# Patient Record
Sex: MALE | Race: WHITE
[De-identification: names, ages, dates, MRNs, and addresses within clinical notes are randomized per-mention and may not be internally consistent; named-entity substitution may affect disease eponyms.]

---

## 2019-01-20 ENCOUNTER — HOSPITAL ENCOUNTER (EMERGENCY)
Dept: HOSPITAL 88 - ER | Age: 44
Discharge: HOME | End: 2019-01-20
Payer: SELF-PAY

## 2019-01-20 VITALS — SYSTOLIC BLOOD PRESSURE: 140 MMHG | DIASTOLIC BLOOD PRESSURE: 88 MMHG

## 2019-01-20 VITALS — BODY MASS INDEX: 33.18 KG/M2 | WEIGHT: 224 LBS | HEIGHT: 69 IN

## 2019-01-20 DIAGNOSIS — I10: ICD-10-CM

## 2019-01-20 DIAGNOSIS — R10.33: Primary | ICD-10-CM

## 2019-01-20 DIAGNOSIS — K43.9: ICD-10-CM

## 2019-01-20 DIAGNOSIS — R11.0: ICD-10-CM

## 2019-01-20 LAB
ALBUMIN SERPL-MCNC: 4 G/DL (ref 3.5–5)
ALBUMIN/GLOB SERPL: 1.2 {RATIO} (ref 0.8–2)
ALP SERPL-CCNC: 119 IU/L (ref 40–150)
ALT SERPL-CCNC: 30 IU/L (ref 0–55)
ANION GAP SERPL CALC-SCNC: 15.9 MMOL/L (ref 8–16)
BACTERIA URNS QL MICRO: (no result) /HPF
BASOPHILS # BLD AUTO: 0.1 10*3/UL (ref 0–0.1)
BASOPHILS NFR BLD AUTO: 0.7 % (ref 0–1)
BILIRUB UR QL: NEGATIVE
BUN SERPL-MCNC: 20 MG/DL (ref 7–26)
BUN/CREAT SERPL: 18 (ref 6–25)
CALCIUM SERPL-MCNC: 8.8 MG/DL (ref 8.4–10.2)
CHLORIDE SERPL-SCNC: 103 MMOL/L (ref 98–107)
CLARITY UR: CLEAR
CO2 SERPL-SCNC: 20 MMOL/L (ref 22–29)
COLOR UR: YELLOW
DEPRECATED NEUTROPHILS # BLD AUTO: 5 10*3/UL (ref 2.1–6.9)
DEPRECATED RBC URNS MANUAL-ACNC: (no result) /HPF (ref 0–5)
EGFRCR SERPLBLD CKD-EPI 2021: > 60 ML/MIN (ref 60–?)
EOSINOPHIL # BLD AUTO: 0.1 10*3/UL (ref 0–0.4)
EOSINOPHIL NFR BLD AUTO: 1.2 % (ref 0–6)
EPI CELLS URNS QL MICRO: (no result) /LPF
ERYTHROCYTE [DISTWIDTH] IN CORD BLOOD: 12.9 % (ref 11.7–14.4)
GLOBULIN PLAS-MCNC: 3.4 G/DL (ref 2.3–3.5)
GLUCOSE SERPLBLD-MCNC: 150 MG/DL (ref 74–118)
HCT VFR BLD AUTO: 46.4 % (ref 38.2–49.6)
HGB BLD-MCNC: 16.3 G/DL (ref 14–18)
KETONES UR QL STRIP.AUTO: NEGATIVE
LEUKOCYTE ESTERASE UR QL STRIP.AUTO: NEGATIVE
LIPASE SERPL-CCNC: 20 U/L (ref 8–78)
LYMPHOCYTES # BLD: 2 10*3/UL (ref 1–3.2)
LYMPHOCYTES NFR BLD AUTO: 26.6 % (ref 18–39.1)
MCH RBC QN AUTO: 31 PG (ref 28–32)
MCHC RBC AUTO-ENTMCNC: 35.1 G/DL (ref 31–35)
MCV RBC AUTO: 88.2 FL (ref 81–99)
MONOCYTES # BLD AUTO: 0.5 10*3/UL (ref 0.2–0.8)
MONOCYTES NFR BLD AUTO: 6.3 % (ref 4.4–11.3)
NEUTS SEG NFR BLD AUTO: 64.9 % (ref 38.7–80)
NITRITE UR QL STRIP.AUTO: NEGATIVE
PLATELET # BLD AUTO: 257 X10E3/UL (ref 140–360)
POTASSIUM SERPL-SCNC: 3.9 MMOL/L (ref 3.5–5.1)
PROT UR QL STRIP.AUTO: NEGATIVE
RBC # BLD AUTO: 5.26 X10E6/UL (ref 4.3–5.7)
SODIUM SERPL-SCNC: 135 MMOL/L (ref 136–145)
SP GR UR STRIP: 1.01 (ref 1.01–1.02)
UROBILINOGEN UR STRIP-MCNC: 0.2 MG/DL (ref 0.2–1)
WBC #/AREA URNS HPF: (no result) /HPF (ref 0–5)

## 2019-01-20 PROCEDURE — 99283 EMERGENCY DEPT VISIT LOW MDM: CPT

## 2019-01-20 PROCEDURE — 83690 ASSAY OF LIPASE: CPT

## 2019-01-20 PROCEDURE — 80053 COMPREHEN METABOLIC PANEL: CPT

## 2019-01-20 PROCEDURE — 74177 CT ABD & PELVIS W/CONTRAST: CPT

## 2019-01-20 PROCEDURE — 87086 URINE CULTURE/COLONY COUNT: CPT

## 2019-01-20 PROCEDURE — 85025 COMPLETE CBC W/AUTO DIFF WBC: CPT

## 2019-01-20 PROCEDURE — 81001 URINALYSIS AUTO W/SCOPE: CPT

## 2019-01-20 PROCEDURE — 36415 COLL VENOUS BLD VENIPUNCTURE: CPT

## 2019-01-20 NOTE — XMS REPORT
Clinical Summary

                             Created on: 2019



Víctor Lawler Kevin

External Reference #: [id number]

: 1975

Sex: Male



Demographics







                          Address                   [address] [id number]

 

                          Home Phone                [phone]

 

                          Preferred Language        English

 

                          Marital Status            Single

 

                          [employer]ybeterian Affiliation     Unknown

 

                          Race                      White

 

                          Ethnic Group              Non-





Author







                          Author                    [facility]

 

                          Organization              [facility]

 

                          Address                   Unknown

 

                          Phone                     Unavailable







Support







                Name            Relationship    Address         Phone

 

                    Keyanna Bajwa    [employer]                [address] [place]  [address]                    [phone]







Care Team Providers







                    Care Team Member Name    Role                Phone

 

                    Andrew Andrews MD    PCP                 [phone]







Allergies

No Known Allergies



Medications







                          End Date                  Status



              Medication     Sig          Dispensed     Refills      Start  



                                         Date  

 

                                                    Active



                     amLODIPine (NORVASC) 10     Take 10 mg by       0   



                           mg tablet                 mouth daily.     







Active Problems







 



                           Problem                   Noted Date

 

 



                           Flank pain                2017







Social History







                                        Date



                 Tobacco Use     Types           Packs/Day       Years Used 

 

                                         



                                         Never Smoker    

 

    



                                         Smokeless Tobacco: Never   



                                         Used   









                                        Tobacco Cessation: Counseling Given: No











   



                 Alcohol Use     Drinks/Week     oz/Week         Comments

 

   



                                         Yes   









 



                           Sex Assigned at Birth     Date Recorded

 

 



                                         Not on file 









                                        Industry



                           Job Start Date            Occupation 

 

                                        Not on file



                           Not on file               Not on file 









                                        Travel End



                           Travel History            Travel Start 

 





                                         No recent travel history available.







Last Filed Vital Signs

Not on file



Plan of Treatment







   



                 Health Maintenance     Due Date        Last Done       Comments

 

   



                           INFLUENZA VACCINE         2018  







Results

Not on fileafter 2018



Insurance







     



            Payer      Benefit     Subscriber ID     Type       Phone      Address



                                         Plan /    



                                         Group    

 

     



                 BCBS            BCBS            xxxxxxxxxxxxxxx     PPO  



                                         CHOICE    



                                         PPO/[employer] PPO    









     



            Guarantor Name     Account     Relation to     Date of     Phone      Billing Address



                     Type                Patient             Birth  

 

     



            Víctor Lawler     Personal/F     Self       1975     [phone]     [address] [place] [address]               (Home)              [place] [address]

## 2019-01-20 NOTE — DIAGNOSTIC IMAGING REPORT
EXAM:  CT of the abdomen and pelvis WITH contrast



HISTORY:  Pain, r/o hernia sbo divertculitis appy    

COMPARISON:  CT of the abdomen and pelvis November 3, 2017..



TECHNIQUE: 

The abdomen and pelvis were scanned utilizing a multidetector helical scanner. 

Coronal and sagittal reformats are provided.

            PROTOCOL:  Routine

            IV CONTRAST:  100 cc of Isovue-370.

            ORAL CONTRAST:  Dilute Gastrografin

            RADIATION DOSE: Total DLP: 622.02 mGy*cm

                      Estimated effective dose:  (DLP x 0.015 x size factor)

Dose modulation, iterative reconstruction, and/or weight based adjustment of

the mA/kV was utilized to reduce the radiation dose to as low as reasonably

achievable.

            COMPLICATIONS: None



FINDINGS:

LINES and TUBES: None.



LOWER THORAX:  Unremarkable



HEPATOBILIARY: No focal hepatic lesions. No biliary ductal dilation. 

GALLBLADDER: No radio-opaque stones or sludge.  No wall thickening.

SPLEEN: No splenomegaly. 

PANCREAS: No focal masses or ductal dilatation.  

ADRENALS: No adrenal nodules    



KIDNEYS/URETERS:  Resolved hydronephrosis. No cystic or solid mass lesions.  A

2 mm nonobstructing stone at the interpolar region of the right kidney. The

large stone near the inferior pole is no longer present and there is been

interval removal of the right ureteral stent.



GI TRACT: No abnormal distention, wall thickening, or evidence of bowel

obstruction.  There are diverticula within the colon without evidence of

diverticulitis.  The appendix remains normal.  Radiopaque contrast within the

distal esophagus.



PELVIC ORGANS/BLADDER: The urinary bladder is partially decompressed.



LYMPH NODES: No lymphadenopathy.

VESSELS: Unremarkable.

PERITONEUM / RETROPERITONEUM: No free air or fluid.

BONES: Unremarkable.

SOFT TISSUES: A small fat-containing ventral midline upper abdominal hernia

(series 2 image 41 and sagittal image 75), increased focal fat stranding in

comparison to November 2017. Stable appearing small fat-containing umbilical

hernia.



IMPRESSION: 

1.  No acute intra-abdominal nor intrapelvic abnormality.

2.  No acute appendicitis or acute diverticulitis.

3.  Small nonobstructing 2 mm right renal stone.

4.  A small ventral midline upper abdominal fat-containing hernia with adjacent

inflammatory changes which may reflect mechanical irritation or a component of

vascular compromise.  Correlate with physical exam if the pain localizes to

this region.

5.  Stable fat-containing small umbilical hernia.



Signed by: JONATHAN Duncan.CASEY., M.M.M. on 1/20/2019 5:32 PM

## 2019-01-20 NOTE — XMS REPORT
Patient Summary Document

                             Created on: 2019



LEEANNA BAUER

External Reference #: [id number]

: 1975

Sex: Male



Demographics







                          Address                   [address] [place]  [address]

 

                          Home Phone                [phone]

 

                          Preferred Language        Unknown

 

                          Marital Status            Unknown

 

                          Mandaeism Affiliation     Unknown

 

                          Race                      Unknown

 

                                        Additional Race(s)  

 

                          Ethnic Group              Unknown





Author







                          Author                    [facility]

 

                          [facility]

 

                          Address                   Unknown

 

                          Phone                     Unavailable







Support







                Name            Relationship    Address         Phone

 

                    NONE,  GIVEN        PRS                 [address] [place], [place]  [address] [phone]

 

                    NONE,  GIVEN        PRS                 [address] [place]  [address]                       [phone]

 

                    NONE,  OTHER        PRS                 [address] [place], [place]  [address] [phone]

 

                    TYLER MARTINEZ    PRS                 [address] [place], [place]  [address] [phone]







Care Team Providers







                    Care Team Member Name    Role                Phone

 

                    JONATHAN BRISENO       Unavailable         Unavailable

 

                    LUISA LAO     Unavailable         Unavailable







Payers







             Payer Name    Policy Type    Policy Number    Effective Date    Expiration Date







Problems

This patient has no known problems.



Allergies, Adverse Reactions, Alerts







          Allergy Name    Allergy Type    Status    Severity    Reaction(s)    Onset Date    Inactive 

Date                      Treating Clinician        Comments

 

        No Known Allergies    DA      Active    U               2013 00:00:00                     







Medications

This patient has no known medications.



Results







           Test Description    Test Time    Test Comments    Text Results    Atomic Results    Result

 Comments

 

                CT ABDOMEN/PELVIS [place] [address]      Patient Name: LEEANNA BAUER 
 MR #: [id number]    : 1975 Age/Sex: 42/M  Acct #: [id number] Req #: 
[id number]  Adm Physician:     Ordered by: AUDELIA BRISENO MD  Report #: [id number]
  Location: ER  Room/Bed:     
_______________________________________________________________________________
____________________    Procedure: [id number] CT/CT ABDOMEN/PELVIS WO  Exam Date:
17                            Exam Time: 1701       REPORT STATUS: Signed 
  PROCEDURE: CT ABDOMEN AND PELVIS WITHOUT CONTRAST   COMPARISON:   CT 
abdomen/pelvis 17.   INDICATIONS:   RIGHT SIDE FLANK PAIN FOR 3 DAYS   
TECHNIQUE: Axial CT images through the abdomen and pelvis were obtained    
without IV or oral contrast. Coronal and sagittal reformations were    created. 
     FINDINGS:   Lung bases: Noncalcified nodule in the posterior left lower 
lobe    measures 5 mm and is stable. Posterior right lower lobe soft tissue    
nodule measures 4 mm and is stable. An anterior pericardial effusion    measures
8 mm (previously, 5 mm).       Liver: Normal attenuation without mass.        
Spleen: Normal size and attenuation without mass.       Biliary: The gallbladder
is present and normal in morphology. No    biliary ductal dilatation. No 
evidence of gallstone.       Pancreas: Normal attenuation without mass. No d
uctal dilatation.       Adrenal Glands: No evidence of mass.       Kidneys:     
Right kidney: Stent in the right renal collecting system extends into    the 
bladder. Hydronephrosis has improved. A stone in the lower pole    measures 10 
mm. Previously, it was in the renal pelvis at the UPJ. No    calculus along the 
course of the stent. No cortical mass.       Left kidney: No renal calculus, 
cortical mass, or perinephric    inflammation. No collecting system dilatation. 
     Vasculature: The aorta and IVC are normal in diameter.       GI: The 
stomach, small bowel, and large bowel are normal in diameter    with normal wall
thickness. The appendix is normal.       Peritoneum/Retroperitoneum: No free 
fluid or fluid collection.       Bladder: No mural thickening or intraluminal 
calculi.        Reproductive organs: Normal.       Musculoskeletal: No focal 
osseous lesions.                 CONCLUSION:           1.   10 mm calculus is 
now in the lower pole of the right kidney. Right    ureteral stent is in 
appropriate position with resolution of    hydronephrosis.   2. No evidence of 
calculus in the left kidney, ureter, or bladder.   3. No bowel obstruction or 
inflammation. Normal appendix.    4. Small but enlarging pericardial effusion.  
5. Stable pulmonary nodules. Recommend annual surveillance with low    dose CT 
of the chest to confirm stability based on risk factors for    malignancy.      
     Dictated by:  Kathy White M.D. on 2017 at 17:43        
Electronically approved by:  Kathy White M.D. on 2017 at    17:43    
           Dictated By: KATHY WHITE MD  Electronically Signed By: KATHY WHITE MD on 17  Transcribed By: GREGORY on 17       COPY 
TO:   AUDELIA BRISENO MD                    

 

                ABDOMEN-1VIEW (KUB)                                        [address] [place] [address]      Patient Name: LEEANNA BAUER 
 MR #: [id number]    : 1975 Age/Sex: 42/M  Acct #: [id number] Req #: 
[id number]  Adm Physician: LUISA LAO MD    Ordered by: SKIP ABREU MD  
Report #: [id number]   [facility]  Room[facility]Bed: [facility]    
_____________________________________________________
______________________________________________    Procedure: [id number] 
DX/ABDOMEN-1VIEW (KUB)  Exam Date: 17                            Exam 
Time: 1424       REPORT STATUS: Signed    PROCEDURE:   X-RAY ABDOMEN - KUB      
COMPARISON:   CT abdomen pelvis 2017.       INDICATIONS:   KIDNEY STONE    
  FINDINGS:      There is a non-obstructed bowel-gas pattern. There are no acute
osseous    abnormalities. The lung bases are clear.       1.0 cm stone in the 
right UPJ junction is unchanged.       CONCLUSION:      1.0 cm stone in the 
right UPJ junction is unchanged.       Dictated by:  Ascencion Handy M.D. on 2017 
at 14:52        Electronically approved by:  Ascencion Handy M.D. on 2017 at 14:52
               Dictated By: ASCENCION HANDY MD  Electronically Signed By: ASCENCION HANDY MD on 
17  Transcribed By: GREGORY on 17       COPY TO:   
SKIP ABREU MD                            

 

                CT ABDOMEN/PELVIS WO                                        [address], [place] [address]      Patient Name: LEEANNA BAUER 
 MR #: [id number]    : 1975 Age/Sex: 42/M  [facility] #: [id number] Req #: 
[id number]  Adm Physician:     Ordered by: CARIN GUILLAUME MD  Report #: [id number]
[id number]   Location: ER  Room/Bed:     
______________________________________________________________________________
_____________________    Procedure: [id number] CT/CT ABDOMEN/PELVIS WO  Exam 
Date: 17                            Exam Time: 0040       REPORT STATUS: 
Signed    EXAM: CT Abdomen and Pelvis WITHOUT contrast     INDICATION: Right 
flank pain   COMPARISON: None.   TECHNIQUE: Abdomen and pelvis were scanned 
utilizing a multidetector helical   scanner from the lung base to the pubic 
symphysis without administration of IV   contrast. Absence of intravenous 
contrast decreases sensitivity for detection   of focal lesions and vascular 
pathology. Coronal and sagittal reformations were   obtained. Stone protocol is 
performed.               IV CONTRAST: None.               ORAL CONTRAST: None   
           RADIATION DOSE: Total DLP: 539.94 mGy*cm                Estimated 
effective dose: (DLP x 0.015 x size factor) mSv               COMPLICATIONS: 
None      FINDINGS:      LINES and TUBES: None.      LOWER THORAX:  Unremarkable
     HEPATOBILIARY:      No focal hepatic lesions. No biliary ductal dilation.  
    GALLBLADDER: No radio-opaque stones or sludge.  No wall thickening.      
SPLEEN: No splenomegaly.       PANCREAS: No focal masses or ductal dilatation.  
     ADRENALS: No adrenal nodules          KIDNEYS/URETERS:  Mild/moderate right
hydronephrosis. No cystic or solid mass   lesions.  1 cm stone in the right UPJ 
best seen on series 401, image 61 and   series 3, image 73      GI TRACT: No 
abnormal distention, wall thickening, or evidence of bowel   obstruction.  There
are diverticula within the colon without evidence of   diverticulitis.  Appendix
is normal.      PELVIC ORGANS/BLADDER: Unremarkable.      LYMPH NODES: No 
lymphadenopathy.      VESSELS: Unremarkable.      PERITONEUM / RETROPERITONEUM: 
No free air or fluid.      BONES: Unremarkable.      SOFT TISSUES: Unremarkable.
                 IMPRESSION:    1.  Obstructive right ureteropelvic junction 
stone measuring 1 cm in maximum   dimension.   2.  Moderate right 
hydronephrosis.      Signed by: Dr. Shay Garcia M.D. on 2017 1:43 AM      
 Dictated By: SHAY ODOM MD  Electronically Signed By: SHAY STEPHEN MD on 17  Transcribed By: KEY on 17       COPY 
TO:   CARIN GUILLAUME MD

## 2024-07-29 LAB
BASOPHILS # BLD AUTO: 0.1 10*3/UL (ref 0–0.1)
BASOPHILS NFR BLD AUTO: 0.9 % (ref 0–1)
DEPRECATED NEUTROPHILS # BLD AUTO: 5.8 10*3/UL (ref 2.1–6.9)
EOSINOPHIL # BLD AUTO: 0.1 10*3/UL (ref 0–0.4)
EOSINOPHIL NFR BLD AUTO: 1.2 % (ref 0–6)
ERYTHROCYTE [DISTWIDTH] IN CORD BLOOD: 12.2 % (ref 11.7–14.4)
HCT VFR BLD AUTO: 48.7 % (ref 38.2–49.6)
HGB BLD-MCNC: 16.6 G/DL (ref 14–18)
LYMPHOCYTES # BLD: 2.3 10*3/UL (ref 1–3.2)
LYMPHOCYTES NFR BLD AUTO: 26 % (ref 18–39.1)
MCH RBC QN AUTO: 30.6 PG (ref 28–32)
MCHC RBC AUTO-ENTMCNC: 34.1 G/DL (ref 31–35)
MCV RBC AUTO: 89.9 FL (ref 81–99)
MONOCYTES # BLD AUTO: 0.7 10*3/UL (ref 0.2–0.8)
MONOCYTES NFR BLD AUTO: 7.6 % (ref 4.4–11.3)
NEUTS SEG NFR BLD AUTO: 64.1 % (ref 38.7–80)
PLATELET # BLD AUTO: 234 X10E3/UL (ref 140–360)
RBC # BLD AUTO: 5.42 X10E6/UL (ref 4.3–5.7)
WBC # BLD: 8.99 X10E3/UL (ref 4.8–10.8)

## 2024-08-02 ENCOUNTER — HOSPITAL ENCOUNTER (OUTPATIENT)
Dept: HOSPITAL 88 - OR | Age: 49
Discharge: HOME | End: 2024-08-02
Attending: INTERNAL MEDICINE
Payer: COMMERCIAL

## 2024-08-02 VITALS
TEMPERATURE: 97.4 F | OXYGEN SATURATION: 95 % | SYSTOLIC BLOOD PRESSURE: 121 MMHG | RESPIRATION RATE: 16 BRPM | HEART RATE: 89 BPM | DIASTOLIC BLOOD PRESSURE: 89 MMHG

## 2024-08-02 DIAGNOSIS — I10: ICD-10-CM

## 2024-08-02 DIAGNOSIS — D12.3: ICD-10-CM

## 2024-08-02 DIAGNOSIS — Z71.3: ICD-10-CM

## 2024-08-02 DIAGNOSIS — Z79.82: ICD-10-CM

## 2024-08-02 DIAGNOSIS — Z79.899: ICD-10-CM

## 2024-08-02 DIAGNOSIS — Z01.810: ICD-10-CM

## 2024-08-02 DIAGNOSIS — I25.10: ICD-10-CM

## 2024-08-02 DIAGNOSIS — Z71.89: ICD-10-CM

## 2024-08-02 DIAGNOSIS — E78.5: ICD-10-CM

## 2024-08-02 DIAGNOSIS — N20.0: ICD-10-CM

## 2024-08-02 DIAGNOSIS — Z95.5: ICD-10-CM

## 2024-08-02 DIAGNOSIS — K62.5: Primary | ICD-10-CM

## 2024-08-02 DIAGNOSIS — Z01.812: ICD-10-CM

## 2024-08-02 DIAGNOSIS — D12.4: ICD-10-CM

## 2024-08-02 DIAGNOSIS — Z71.6: ICD-10-CM

## 2024-08-02 DIAGNOSIS — K64.8: ICD-10-CM

## 2024-08-02 DIAGNOSIS — K57.30: ICD-10-CM

## 2024-08-02 DIAGNOSIS — F17.220: ICD-10-CM

## 2024-08-02 PROCEDURE — 36415 COLL VENOUS BLD VENIPUNCTURE: CPT

## 2024-08-02 PROCEDURE — 45380 COLONOSCOPY AND BIOPSY: CPT

## 2024-08-02 PROCEDURE — 45384 COLONOSCOPY W/LESION REMOVAL: CPT

## 2024-08-02 PROCEDURE — 85025 COMPLETE CBC W/AUTO DIFF WBC: CPT

## 2024-08-02 PROCEDURE — 45385 COLONOSCOPY W/LESION REMOVAL: CPT

## 2024-08-02 PROCEDURE — 93005 ELECTROCARDIOGRAM TRACING: CPT

## 2024-08-02 RX ADMIN — METOPROLOL TARTRATE ONE MG: 1 INJECTION, SOLUTION INTRAVENOUS at 12:21

## 2024-08-02 RX ADMIN — SODIUM CHLORIDE, POTASSIUM CHLORIDE, SODIUM LACTATE AND CALCIUM CHLORIDE ONE: 600; 310; 30; 20 INJECTION, SOLUTION INTRAVENOUS at 12:10

## 2024-09-27 ENCOUNTER — HOSPITAL ENCOUNTER (OUTPATIENT)
Dept: HOSPITAL 88 - ER | Age: 49
Setting detail: OBSERVATION
LOS: 1 days | Discharge: HOME | End: 2024-09-28
Attending: INTERNAL MEDICINE | Admitting: INTERNAL MEDICINE
Payer: COMMERCIAL

## 2024-09-27 VITALS
TEMPERATURE: 97.6 F | RESPIRATION RATE: 18 BRPM | HEART RATE: 80 BPM | OXYGEN SATURATION: 100 % | SYSTOLIC BLOOD PRESSURE: 140 MMHG | DIASTOLIC BLOOD PRESSURE: 92 MMHG

## 2024-09-27 VITALS
OXYGEN SATURATION: 100 % | DIASTOLIC BLOOD PRESSURE: 115 MMHG | HEART RATE: 73 BPM | TEMPERATURE: 97.7 F | SYSTOLIC BLOOD PRESSURE: 172 MMHG | RESPIRATION RATE: 20 BRPM

## 2024-09-27 VITALS
HEART RATE: 80 BPM | TEMPERATURE: 97.6 F | SYSTOLIC BLOOD PRESSURE: 140 MMHG | OXYGEN SATURATION: 100 % | DIASTOLIC BLOOD PRESSURE: 92 MMHG | RESPIRATION RATE: 18 BRPM

## 2024-09-27 VITALS — BODY MASS INDEX: 34.86 KG/M2 | HEIGHT: 68 IN | WEIGHT: 230 LBS

## 2024-09-27 VITALS
SYSTOLIC BLOOD PRESSURE: 140 MMHG | RESPIRATION RATE: 18 BRPM | TEMPERATURE: 97.6 F | HEART RATE: 80 BPM | DIASTOLIC BLOOD PRESSURE: 92 MMHG | OXYGEN SATURATION: 100 %

## 2024-09-27 VITALS
HEART RATE: 80 BPM | DIASTOLIC BLOOD PRESSURE: 96 MMHG | RESPIRATION RATE: 18 BRPM | SYSTOLIC BLOOD PRESSURE: 140 MMHG | TEMPERATURE: 97.6 F | OXYGEN SATURATION: 100 %

## 2024-09-27 VITALS — HEART RATE: 79 BPM

## 2024-09-27 VITALS
DIASTOLIC BLOOD PRESSURE: 92 MMHG | TEMPERATURE: 97.6 F | RESPIRATION RATE: 18 BRPM | SYSTOLIC BLOOD PRESSURE: 140 MMHG | HEART RATE: 80 BPM | OXYGEN SATURATION: 100 %

## 2024-09-27 VITALS — TEMPERATURE: 98.4 F

## 2024-09-27 DIAGNOSIS — I25.10: ICD-10-CM

## 2024-09-27 DIAGNOSIS — I31.39: ICD-10-CM

## 2024-09-27 DIAGNOSIS — I10: ICD-10-CM

## 2024-09-27 DIAGNOSIS — N13.2: Primary | ICD-10-CM

## 2024-09-27 DIAGNOSIS — Z79.899: ICD-10-CM

## 2024-09-27 DIAGNOSIS — N17.9: ICD-10-CM

## 2024-09-27 DIAGNOSIS — N40.0: ICD-10-CM

## 2024-09-27 DIAGNOSIS — Z95.5: ICD-10-CM

## 2024-09-27 DIAGNOSIS — Z79.82: ICD-10-CM

## 2024-09-27 DIAGNOSIS — E78.5: ICD-10-CM

## 2024-09-27 LAB
ALBUMIN SERPL-MCNC: 3.5 G/DL (ref 3.5–5)
ALBUMIN/GLOB SERPL: 1.2 {RATIO} (ref 0.8–2)
ALP SERPL-CCNC: 84 IU/L (ref 40–150)
ALT SERPL-CCNC: 22 IU/L (ref 0–55)
ANION GAP SERPL CALC-SCNC: 11.7 MMOL/L (ref 8–16)
BACTERIA URNS QL MICRO: (no result) /HPF
BASOPHILS # BLD AUTO: 0 10*3/UL (ref 0–0.1)
BASOPHILS NFR BLD AUTO: 0.6 % (ref 0–1)
BILIRUB SERPL-MCNC: 0.5 MG/DL (ref 0.2–1.2)
BILIRUB UR QL: (no result)
BUN SERPL-MCNC: 21 MG/DL (ref 7–26)
BUN/CREAT SERPL: 14 (ref 6–25)
CALCIUM SERPL-MCNC: 8.4 MG/DL (ref 8.4–10.2)
CHLORIDE SERPL-SCNC: 106 MMOL/L (ref 98–107)
CLARITY UR: (no result)
CO2 SERPL-SCNC: 23 MMOL/L (ref 22–29)
COLOR UR: (no result)
DEPRECATED NEUTROPHILS # BLD AUTO: 4 10*3/UL (ref 2.1–6.9)
DEPRECATED RBC URNS MANUAL-ACNC: >50 /HPF (ref 0–5)
EGFRCR SERPLBLD CKD-EPI 2021: 56 ML/MIN (ref 60–?)
EOSINOPHIL # BLD AUTO: 0.1 10*3/UL (ref 0–0.4)
EOSINOPHIL NFR BLD AUTO: 1.2 % (ref 0–6)
EPI CELLS URNS QL MICRO: (no result) /LPF
ERYTHROCYTE [DISTWIDTH] IN CORD BLOOD: 12.7 % (ref 11.7–14.4)
GLOBULIN PLAS-MCNC: 2.9 G/DL (ref 2.3–3.5)
GLUCOSE SERPLBLD-MCNC: 109 MG/DL (ref 74–118)
GLUCOSE UR QL STRIP.AUTO: NEGATIVE
HCT VFR BLD AUTO: 39.9 % (ref 38.2–49.6)
HGB BLD-MCNC: 13.4 G/DL (ref 14–18)
KETONES UR QL STRIP.AUTO: NEGATIVE
LEUKOCYTE ESTERASE UR QL STRIP.AUTO: (no result)
LIPASE SERPL-CCNC: 42 U/L (ref 8–78)
LYMPHOCYTES # BLD: 1.9 10*3/UL (ref 1–3.2)
LYMPHOCYTES NFR BLD AUTO: 28.6 % (ref 18–39.1)
MCH RBC QN AUTO: 30.3 PG (ref 28–32)
MCHC RBC AUTO-ENTMCNC: 33.6 G/DL (ref 31–35)
MCV RBC AUTO: 90.3 FL (ref 81–99)
MONOCYTES # BLD AUTO: 0.5 10*3/UL (ref 0.2–0.8)
MONOCYTES NFR BLD AUTO: 7.6 % (ref 4.4–11.3)
NEUTS SEG NFR BLD AUTO: 61.2 % (ref 38.7–80)
NITRITE UR QL STRIP.AUTO: NEGATIVE
PH UR STRIP.AUTO: 6 [PH] (ref 5–7)
PLATELET # BLD AUTO: 181 X10E3/UL (ref 140–360)
POTASSIUM SERPL-SCNC: 3.7 MMOL/L (ref 3.5–5.1)
PROT SERPL-MCNC: 6.4 G/DL (ref 6.5–8.1)
PROT UR QL STRIP.AUTO: (no result)
RBC # BLD AUTO: 4.42 X10E6/UL (ref 4.3–5.7)
SODIUM SERPL-SCNC: 137 MMOL/L (ref 136–145)
SP GR UR STRIP: 1.02 (ref 1.01–1.02)
UROBILINOGEN UR STRIP-MCNC: 0.2 MG/DL (ref 0.2–1)
WBC # BLD: 6.47 X10E3/UL (ref 4.8–10.8)
WBC #/AREA URNS HPF: (no result) /HPF (ref 0–5)

## 2024-09-27 PROCEDURE — 74176 CT ABD & PELVIS W/O CONTRAST: CPT

## 2024-09-27 PROCEDURE — 36415 COLL VENOUS BLD VENIPUNCTURE: CPT

## 2024-09-27 PROCEDURE — 99284 EMERGENCY DEPT VISIT MOD MDM: CPT

## 2024-09-27 PROCEDURE — 83970 ASSAY OF PARATHORMONE: CPT

## 2024-09-27 PROCEDURE — 84550 ASSAY OF BLOOD/URIC ACID: CPT

## 2024-09-27 PROCEDURE — 74420 UROGRAPHY RTRGR +-KUB: CPT

## 2024-09-27 PROCEDURE — 85025 COMPLETE CBC W/AUTO DIFF WBC: CPT

## 2024-09-27 PROCEDURE — 80053 COMPREHEN METABOLIC PANEL: CPT

## 2024-09-27 PROCEDURE — 52332 CYSTOSCOPY AND TREATMENT: CPT

## 2024-09-27 PROCEDURE — 83690 ASSAY OF LIPASE: CPT

## 2024-09-27 PROCEDURE — 81001 URINALYSIS AUTO W/SCOPE: CPT

## 2024-09-27 RX ADMIN — METOPROLOL SUCCINATE SCH MG: 25 TABLET, EXTENDED RELEASE ORAL at 17:40

## 2024-09-27 RX ADMIN — SODIUM CHLORIDE SCH MLS/HR: 9 INJECTION, SOLUTION INTRAVENOUS at 03:47

## 2024-09-27 RX ADMIN — SODIUM CHLORIDE PRN MG: 900 INJECTION INTRAVENOUS at 06:09

## 2024-09-27 RX ADMIN — Medication SCH MG: at 21:38

## 2024-09-27 RX ADMIN — Medication PRN MG: at 06:09

## 2024-09-27 RX ADMIN — HYDROMORPHONE HYDROCHLORIDE ONE MG: 1 INJECTION, SOLUTION INTRAMUSCULAR; INTRAVENOUS; SUBCUTANEOUS at 11:39

## 2024-09-27 RX ADMIN — SODIUM CHLORIDE STA MG: 900 INJECTION INTRAVENOUS at 02:10

## 2024-09-27 RX ADMIN — KETOROLAC TROMETHAMINE STA MG: 30 INJECTION, SOLUTION INTRAMUSCULAR; INTRAVENOUS at 02:10

## 2024-09-27 RX ADMIN — ACETAMINOPHEN AND CODEINE PHOSPHATE PRN EA: 300; 30 TABLET ORAL at 17:40

## 2024-09-27 RX ADMIN — SODIUM CHLORIDE STA MLS/HR: 9 INJECTION, SOLUTION INTRAVENOUS at 02:10

## 2024-09-28 VITALS
OXYGEN SATURATION: 98 % | HEART RATE: 66 BPM | RESPIRATION RATE: 18 BRPM | SYSTOLIC BLOOD PRESSURE: 145 MMHG | DIASTOLIC BLOOD PRESSURE: 78 MMHG | TEMPERATURE: 98.2 F

## 2024-09-28 VITALS
RESPIRATION RATE: 18 BRPM | TEMPERATURE: 98 F | HEART RATE: 68 BPM | SYSTOLIC BLOOD PRESSURE: 147 MMHG | OXYGEN SATURATION: 98 % | DIASTOLIC BLOOD PRESSURE: 88 MMHG

## 2024-09-28 VITALS
RESPIRATION RATE: 18 BRPM | HEART RATE: 63 BPM | DIASTOLIC BLOOD PRESSURE: 83 MMHG | SYSTOLIC BLOOD PRESSURE: 134 MMHG | TEMPERATURE: 96.8 F | OXYGEN SATURATION: 98 %

## 2024-09-28 VITALS
DIASTOLIC BLOOD PRESSURE: 97 MMHG | RESPIRATION RATE: 18 BRPM | SYSTOLIC BLOOD PRESSURE: 147 MMHG | OXYGEN SATURATION: 97 % | TEMPERATURE: 97.6 F | HEART RATE: 77 BPM

## 2024-09-28 VITALS
SYSTOLIC BLOOD PRESSURE: 168 MMHG | DIASTOLIC BLOOD PRESSURE: 100 MMHG | OXYGEN SATURATION: 98 % | RESPIRATION RATE: 18 BRPM | TEMPERATURE: 97.7 F | HEART RATE: 77 BPM

## 2024-09-28 LAB
ALBUMIN SERPL-MCNC: 3.1 G/DL (ref 3.5–5)
ALBUMIN/GLOB SERPL: 1 {RATIO} (ref 0.8–2)
ALP SERPL-CCNC: 70 IU/L (ref 40–150)
ALT SERPL-CCNC: 19 IU/L (ref 0–55)
ANION GAP SERPL CALC-SCNC: 12.2 MMOL/L (ref 8–16)
BASOPHILS # BLD AUTO: 0 10*3/UL (ref 0–0.1)
BASOPHILS NFR BLD AUTO: 0.3 % (ref 0–1)
BILIRUB SERPL-MCNC: 0.4 MG/DL (ref 0.2–1.2)
BUN SERPL-MCNC: 18 MG/DL (ref 7–26)
BUN/CREAT SERPL: 15 (ref 6–25)
CALCIUM SERPL-MCNC: 8.7 MG/DL (ref 8.4–10.2)
CHLORIDE SERPL-SCNC: 106 MMOL/L (ref 98–107)
CO2 SERPL-SCNC: 22 MMOL/L (ref 22–29)
DEPRECATED NEUTROPHILS # BLD AUTO: 5.1 10*3/UL (ref 2.1–6.9)
EGFRCR SERPLBLD CKD-EPI 2021: 75 ML/MIN (ref 60–?)
EOSINOPHIL # BLD AUTO: 0 10*3/UL (ref 0–0.4)
EOSINOPHIL NFR BLD AUTO: 0 % (ref 0–6)
ERYTHROCYTE [DISTWIDTH] IN CORD BLOOD: 12.3 % (ref 11.7–14.4)
GLOBULIN PLAS-MCNC: 3.2 G/DL (ref 2.3–3.5)
GLUCOSE SERPLBLD-MCNC: 127 MG/DL (ref 74–118)
HCT VFR BLD AUTO: 37.6 % (ref 38.2–49.6)
HGB BLD-MCNC: 12.4 G/DL (ref 14–18)
LYMPHOCYTES # BLD: 1 10*3/UL (ref 1–3.2)
LYMPHOCYTES NFR BLD AUTO: 15.1 % (ref 18–39.1)
MCH RBC QN AUTO: 29.8 PG (ref 28–32)
MCHC RBC AUTO-ENTMCNC: 33 G/DL (ref 31–35)
MCV RBC AUTO: 90.4 FL (ref 81–99)
MONOCYTES # BLD AUTO: 0.4 10*3/UL (ref 0.2–0.8)
MONOCYTES NFR BLD AUTO: 5.4 % (ref 4.4–11.3)
NEUTS SEG NFR BLD AUTO: 78.9 % (ref 38.7–80)
PLATELET # BLD AUTO: 191 X10E3/UL (ref 140–360)
POTASSIUM SERPL-SCNC: 4.2 MMOL/L (ref 3.5–5.1)
PROT SERPL-MCNC: 6.3 G/DL (ref 6.5–8.1)
RBC # BLD AUTO: 4.16 X10E6/UL (ref 4.3–5.7)
SODIUM SERPL-SCNC: 136 MMOL/L (ref 136–145)
WBC # BLD: 6.47 X10E3/UL (ref 4.8–10.8)

## 2024-09-28 RX ADMIN — Medication PRN MG: at 08:33

## 2024-09-28 RX ADMIN — SOLIFENACIN SUCCINATE SCH MG: 5 TABLET, FILM COATED ORAL at 08:28

## 2024-09-28 RX ADMIN — ASPIRIN 81 MG CHEWABLE TABLET SCH MG: 81 TABLET CHEWABLE at 08:28

## 2024-09-28 RX ADMIN — BISACODYL STA MG: 10 SUPPOSITORY RECTAL at 12:44

## 2024-10-01 LAB — CALCIUM SPEC-MCNC: 8.3 MG/DL (ref 8.7–10.2)
